# Patient Record
Sex: MALE | Race: WHITE | NOT HISPANIC OR LATINO | Employment: OTHER | URBAN - METROPOLITAN AREA
[De-identification: names, ages, dates, MRNs, and addresses within clinical notes are randomized per-mention and may not be internally consistent; named-entity substitution may affect disease eponyms.]

---

## 2020-03-11 ENCOUNTER — EVALUATION (OUTPATIENT)
Dept: PHYSICAL THERAPY | Facility: CLINIC | Age: 85
End: 2020-03-11
Payer: MEDICARE

## 2020-03-11 DIAGNOSIS — M54.30 SCIATIC LEG PAIN: ICD-10-CM

## 2020-03-11 DIAGNOSIS — M54.16 LUMBAR RADICULOPATHY: Primary | ICD-10-CM

## 2020-03-11 PROCEDURE — 97162 PT EVAL MOD COMPLEX 30 MIN: CPT

## 2020-03-11 PROCEDURE — 97530 THERAPEUTIC ACTIVITIES: CPT

## 2020-03-11 NOTE — LETTER
2020    MD Garry Ma [address]  [address] [facility]    Patient: Steve Mitchell   YOB: 1931   Date of Visit: 3/11/2020     Encounter Diagnosis     ICD-10-CM    1  Lumbar radiculopathy M54 16    2  Sciatic leg pain M54 30        Dear Dr Young Police: Thank you for your recent referral of Steve Mitchell  Please review the attached evaluation summary from Terrell's recent visit  Please verify that you agree with the plan of care by signing the attached order  If you have any questions or concerns, please do not hesitate to call  I sincerely appreciate the opportunity to share in the care of one of your patients and hope to have another opportunity to work with you in the near future  Sincerely,    Ting Mackay, PT      Referring Provider:      I certify that I have read the below Plan of Care and certify the need for these services furnished under this plan of treatment while under my care  MD Garry Ma [address]  [address] [facility]: [phone]          PT Evaluation     Today's date: 3/11/2020  Patient name: Steve Mitchell  : 3/28/1931  MRN: [id number]  Referring provider: Mervat Finley MD  Dx:   Encounter Diagnosis     ICD-10-CM    1  Lumbar radiculopathy M54 16    2  Sciatic leg pain M54 30                   Assessment  Assessment details: Steve Mitchell is a 80 y o  male who presents with gait and balance dysfunction secondary to lumbar radiculopathy  Pt reports with  pain, decreased strength, decreased ROM, impaired balance, impaired sensation, ambulatory dysfunction and postural dysfunction  Due to these impairments, patient has difficulty performing a/iadls including household and community ambulation  Significant examination findings include falls risk as indicated by TUG score and significantly impaired R ankle strength/sensation   Patient's clinical presentation is consistent with their referring diagnosis of Lumbar radiculopathy  Patient has been educated HEP, use of orthotic to supplement R foot drop, and proper use of RW  Patient would benefit from skilled physical therapy to address their aforementioned impairments, improve their level of function and to improve their overall quality of life  Impairments: abnormal gait, abnormal muscle firing, abnormal or restricted ROM, activity intolerance, impaired balance, impaired physical strength, lacks appropriate home exercise program, pain with function, safety issue and poor posture   Understanding of Dx/Px/POC: good   Prognosis: good    Goals  Impairment Goals  - Decrease pain less than 4/10 at worst in past 24 hrs in 4 weeks  - Improve l/s ROM by atleast 25% in 4 weeks  - Pt will demonstrate TUG score of no more than 25 seconds w/ least restrictive AD in 4 weeks  Functional Goals  - Pt will demonstrate TUG score of no more than 14 seconds w/ least restrictive AD to decrease falls risk in 12 weeks  - Pt will demonstrate ambulation w/ maintenance of erect posture and lack of foot drop with use of appropriate orthosis/AD in 12 weeks    - Return to Prior Level of Function in 12 weeks  - Increase Functional Status Measure to: atleast 45 in 12 weeks  - Patient will be independent with HEP in 12 weeks    Plan  Patient would benefit from: skilled PT  Planned modality interventions: thermotherapy: hydrocollator packs, cryotherapy, unattended electrical stimulation and electrical stimulation/Russian stimulation  Planned therapy interventions: joint mobilization, manual therapy, patient education, postural training, activity modification, abdominal trunk stabilization, body mechanics training, flexibility, functional ROM exercises, graded exercise, home exercise program, neuromuscular re-education, strengthening, stretching, therapeutic activities, therapeutic exercise, balance, ADL training and orthotic fitting/training  Frequency: 2x week  Duration in weeks: 10  Plan of Care beginning date: 3/11/2020  Plan of Care expiration date: 2020  Treatment plan discussed with: patient        Subjective Evaluation    History of Present Illness  Date of onset: 2019  Mechanism of injury: Pt reports abrupt insidious onset of weakness and R foot drop began one morning when he wasn't able to get out of bed  Also had pain in back w/ radicular pain extending into R foot  Underwent lumbar decompression surgery on 4/10/19  Pt reports surgery improved drop slightly, however, did not change pain  Pt completed aquatherapy w/ perceived improved LE strength  Pt reports 2 falls over last 2 years  First fall was outside on gravel in which pt was unable to get up with assistance for 2 hours  Most recent fall was last night while exiting bathroom  Pt and daughter deny injury, neurological changes, visiting ED/physician since fall  Admits that he has orthotic to address R foot drop, however, does not use it  Agreed to bring in orthotic next session  Reports prior to 1 year ago, pt's PLOF did not include AD  PMH: HTN    Medications: pt will bring list next session  Pain  Current pain ratin  At best pain ratin  At worst pain ratin  Location: R low back extending into R anterior thigh, lateral calf, GT  Quality: sharp and dull ache    Social Support  Steps to enter house: no  Stairs in house: no   Lives with: adult children    Treatments  Current treatment: physical therapy  Patient Goals  Patient goals for therapy: improved balance and increased strength  Patient goal: walk with a cane, improve strength in leg        Objective    Flowsheet Rows      Most Recent Value   PT/OT G-Codes   Current Score  34   Projected Score  48   FOTO information reviewed  Yes         Posture: Lumbar lordosis is decreased in standing   Flexed posture, forward head, rounded shoulders    Lumbar AROM limitations:  (*=  Pain)  Lumbar flexion: *mod (pain lateral calf)  Lumbar extension: *hussain (pain low back/bilat knees)      Strength:       Right  Left  Hip flexion:  4-/5  4-/5  Knee ext  5/5  5/5  Ankle DF  1/5  5/5      Great toe ext  1/5  3+/5  Ankle PF  5/5  5/5  Knee flex  3+/5  5/5  Ankle inver                  1/5                   3+/5  Ankle francine                 3+/5                 3+/5    AROM/PROM:  Ankle DF:                   0/25                    30  Ankle PF:                    55                      55  Ankle Inversion:          0                         5  Ankle Eversion           5                         20    Sensation:   LT: Diminished L5-S1 dermatome  LT: Absent sensation R instep, however normal along remainder of L4 dermatome     Flexibility:  Hip ER WFL as observed via shoe donning/doffing    Reflexes:   R achilles: 0  L achilles: 0  R patellar: 0  L patellar: 0    Function:   Gait: Pt ambulates over noncompliant terrain, outside of RW w/ flexed posture, R foot drop, decreased jesse, decreased step length bilaterally    Balance:  TUG: w/ RW 35 66"  Rhomberg: TBA  Tandem: TBA    Integumentary:  Xerosis bilat feet L>R  (-) swelling/edema BLE  (-) blanchable erythema throughout bilateral calves/dorsum of feet  (-) signs DVT             Precautions: HTN, FALLS RISK      Manual                                                                                   Exercise Diary              RB             STS                          [id number]             Ankle ROM rockerboard             Rhomberg on foam             Tandem              lunges                                                                                                                                  GT w/orthotic and SPC                                           Modalities

## 2020-03-11 NOTE — PROGRESS NOTES
PT Evaluation     Today's date: 3/11/2020  Patient name: Tiff Garcia  : 3/28/1931  MRN: [id number]  Referring provider: Rod Waller MD  Dx:   Encounter Diagnosis     ICD-10-CM    1  Lumbar radiculopathy M54 16    2  Sciatic leg pain M54 30                 ADDENDUM 20: Pt wished to hold treatment d/t COVID-19, D/C at this time d/t inactivity  Assessment  Assessment details: Tiff Garcia is a 80 y o  male who presents with gait and balance dysfunction secondary to lumbar radiculopathy  Pt reports with  pain, decreased strength, decreased ROM, impaired balance, impaired sensation, ambulatory dysfunction and postural dysfunction  Due to these impairments, patient has difficulty performing a/iadls including household and community ambulation  Significant examination findings include falls risk as indicated by TUG score and significantly impaired R ankle strength/sensation  Patient's clinical presentation is consistent with their referring diagnosis of Lumbar radiculopathy  Patient has been educated HEP, use of orthotic to supplement R foot drop, and proper use of RW  Patient would benefit from skilled physical therapy to address their aforementioned impairments, improve their level of function and to improve their overall quality of life  Impairments: abnormal gait, abnormal muscle firing, abnormal or restricted ROM, activity intolerance, impaired balance, impaired physical strength, lacks appropriate home exercise program, pain with function, safety issue and poor posture   Understanding of Dx/Px/POC: good   Prognosis: good    Goals  Impairment Goals  - Decrease pain less than 4/10 at worst in past 24 hrs in 4 weeks  - Improve l/s ROM by atleast 25% in 4 weeks  - Pt will demonstrate TUG score of no more than 25 seconds w/ least restrictive AD in 4 weeks      Functional Goals  - Pt will demonstrate TUG score of no more than 14 seconds w/ least restrictive AD to decrease falls risk in 12 weeks     - Pt will demonstrate ambulation w/ maintenance of erect posture and lack of foot drop with use of appropriate orthosis/AD in 12 weeks  - Return to Prior Level of Function in 12 weeks  - Increase Functional Status Measure to: atleast 45 in 12 weeks  - Patient will be independent with HEP in 12 weeks    Plan  Patient would benefit from: skilled PT  Planned modality interventions: thermotherapy: hydrocollator packs, cryotherapy, unattended electrical stimulation and electrical stimulation/Russian stimulation  Planned therapy interventions: joint mobilization, manual therapy, patient education, postural training, activity modification, abdominal trunk stabilization, body mechanics training, flexibility, functional ROM exercises, graded exercise, home exercise program, neuromuscular re-education, strengthening, stretching, therapeutic activities, therapeutic exercise, balance, ADL training and orthotic fitting/training  Frequency: 2x week  Duration in weeks: 10  Plan of Care beginning date: 3/11/2020  Plan of Care expiration date: 5/20/2020  Treatment plan discussed with: patient        Subjective Evaluation    History of Present Illness  Date of onset: 1/1/2019  Mechanism of injury: Pt reports abrupt insidious onset of weakness and R foot drop began one morning when he wasn't able to get out of bed  Also had pain in back w/ radicular pain extending into R foot  Underwent lumbar decompression surgery on 4/10/19  Pt reports surgery improved drop slightly, however, did not change pain  Pt completed aquatherapy w/ perceived improved LE strength  Pt reports 2 falls over last 2 years  First fall was outside on gravel in which pt was unable to get up with assistance for 2 hours  Most recent fall was last night while exiting bathroom  Pt and daughter deny injury, neurological changes, visiting ED/physician since fall  Admits that he has orthotic to address R foot drop, however, does not use it   Agreed to bring in orthotic next session  Reports prior to 1 year ago, pt's PLOF did not include AD  PMH: HTN    Medications: pt will bring list next session  Pain  Current pain ratin  At best pain ratin  At worst pain ratin  Location: R low back extending into R anterior thigh, lateral calf, GT  Quality: sharp and dull ache    Social Support  Steps to enter house: no  Stairs in house: no   Lives with: adult children    Treatments  Current treatment: physical therapy  Patient Goals  Patient goals for therapy: improved balance and increased strength  Patient goal: walk with a cane, improve strength in leg        Objective    Flowsheet Rows      Most Recent Value   PT/OT G-Codes   Current Score  34   Projected Score  48   FOTO information reviewed  Yes         Posture: Lumbar lordosis is decreased in standing   Flexed posture, forward head, rounded shoulders    Lumbar AROM limitations:  (*=  Pain)  Lumbar flexion: *mod (pain lateral calf)  Lumbar extension: *hussain (pain low back/bilat knees)      Strength:       Right  Left  Hip flexion:  4-/5  4-/5  Knee ext  5/5  5/5  Ankle DF  1/5  5/5      Great toe ext  1/5  3+/5  Ankle PF  5/5  5/5  Knee flex  3+/5  5/5  Ankle inver                  1/5                   3+/5  Ankle francine                 3+/5                 3+/5    AROM/PROM:  Ankle DF:                   0/25                    30  Ankle PF:                    55                      55  Ankle Inversion:          0                         5  Ankle Eversion           5                         20    Sensation:   LT: Diminished L5-S1 dermatome  LT: Absent sensation R instep, however normal along remainder of L4 dermatome     Flexibility:  Hip ER WFL as observed via shoe donning/doffing    Reflexes:   R achilles: 0  L achilles: 0  R patellar: 0  L patellar: 0    Function:   Gait: Pt ambulates over noncompliant terrain, outside of RW w/ flexed posture, R foot drop, decreased jesse, decreased step length bilaterally    Balance:  TUG: w/ RW 35 66"  Rhomberg: TBA  Tandem: TBA    Integumentary:  Xerosis bilat feet L>R  (-) swelling/edema BLE  (-) blanchable erythema throughout bilateral calves/dorsum of feet  (-) signs DVT             Precautions: HTN, FALLS RISK      Manual                                                                                   Exercise Diary              RB             STS                          [email or web address]             Ankle ROM rockerboard             Rhomberg on foam             Tandem              lunges                                                                                                                                  GT w/orthotic and SPC                                           Modalities

## 2020-03-12 ENCOUNTER — TRANSCRIBE ORDERS (OUTPATIENT)
Dept: PHYSICAL THERAPY | Facility: CLINIC | Age: 85
End: 2020-03-12

## 2020-03-12 DIAGNOSIS — M54.16 LUMBAR RADICULOPATHY: Primary | ICD-10-CM

## 2020-03-17 ENCOUNTER — APPOINTMENT (OUTPATIENT)
Dept: PHYSICAL THERAPY | Facility: CLINIC | Age: 85
End: 2020-03-17
Payer: MEDICARE

## 2020-03-19 ENCOUNTER — APPOINTMENT (OUTPATIENT)
Dept: PHYSICAL THERAPY | Facility: CLINIC | Age: 85
End: 2020-03-19
Payer: MEDICARE

## 2020-03-23 ENCOUNTER — APPOINTMENT (OUTPATIENT)
Dept: PHYSICAL THERAPY | Facility: CLINIC | Age: 85
End: 2020-03-23
Payer: MEDICARE

## 2020-03-24 ENCOUNTER — APPOINTMENT (OUTPATIENT)
Dept: PHYSICAL THERAPY | Facility: CLINIC | Age: 85
End: 2020-03-24
Payer: MEDICARE

## 2020-03-26 ENCOUNTER — APPOINTMENT (OUTPATIENT)
Dept: PHYSICAL THERAPY | Facility: CLINIC | Age: 85
End: 2020-03-26
Payer: MEDICARE

## 2020-03-27 ENCOUNTER — APPOINTMENT (OUTPATIENT)
Dept: PHYSICAL THERAPY | Facility: CLINIC | Age: 85
End: 2020-03-27
Payer: MEDICARE

## 2020-03-31 ENCOUNTER — APPOINTMENT (OUTPATIENT)
Dept: PHYSICAL THERAPY | Facility: CLINIC | Age: 85
End: 2020-03-31
Payer: MEDICARE

## 2022-09-09 DIAGNOSIS — F41.9 ANXIETY: Primary | ICD-10-CM

## 2022-09-09 RX ORDER — LORAZEPAM 0.5 MG/1
0.25 TABLET ORAL EVERY 8 HOURS PRN
Qty: 30 TABLET | Refills: 2 | Status: SHIPPED | OUTPATIENT
Start: 2022-09-09 | End: 2022-09-28 | Stop reason: SDUPTHER

## 2022-09-12 DIAGNOSIS — S72.002D CLOSED FRACTURE OF LEFT HIP WITH ROUTINE HEALING, SUBSEQUENT ENCOUNTER: Primary | ICD-10-CM

## 2022-09-12 RX ORDER — TRAMADOL HYDROCHLORIDE 50 MG/1
50 TABLET ORAL
COMMUNITY
Start: 2019-04-11 | End: 2022-09-12 | Stop reason: SDUPTHER

## 2022-09-12 RX ORDER — NALOXONE HYDROCHLORIDE 4 MG/.1ML
SPRAY NASAL
Qty: 1 EACH | Refills: 1 | Status: SHIPPED | OUTPATIENT
Start: 2022-09-12

## 2022-09-12 RX ORDER — TRAMADOL HYDROCHLORIDE 50 MG/1
50 TABLET ORAL EVERY 6 HOURS PRN
Qty: 60 TABLET | Refills: 2 | Status: SHIPPED | OUTPATIENT
Start: 2022-09-12 | End: 2022-10-12

## 2022-09-16 DIAGNOSIS — Z98.890 STATUS POST LUMBAR SPINE SURGERY FOR DECOMPRESSION OF SPINAL CORD: Primary | ICD-10-CM

## 2022-09-16 PROBLEM — N40.1 BPH WITH OBSTRUCTION/LOWER URINARY TRACT SYMPTOMS: Status: ACTIVE | Noted: 2022-09-16

## 2022-09-16 PROBLEM — N13.8 BPH WITH OBSTRUCTION/LOWER URINARY TRACT SYMPTOMS: Status: ACTIVE | Noted: 2022-09-16

## 2022-09-16 PROBLEM — H91.90 HEARING LOSS: Status: ACTIVE | Noted: 2022-09-16

## 2022-09-16 PROBLEM — I10 ESSENTIAL HYPERTENSION: Status: ACTIVE | Noted: 2019-04-01

## 2022-09-16 PROBLEM — M48.062 PSEUDOCLAUDICATION SYNDROME: Status: ACTIVE | Noted: 2019-04-01

## 2022-09-16 RX ORDER — HEPARIN SODIUM 5000 [USP'U]/ML
5000 INJECTION, SOLUTION INTRAVENOUS; SUBCUTANEOUS EVERY 8 HOURS
COMMUNITY
Start: 2019-04-16

## 2022-09-16 RX ORDER — LISINOPRIL 20 MG/1
TABLET ORAL
COMMUNITY
Start: 2022-08-10

## 2022-09-16 RX ORDER — CHLORHEXIDINE GLUCONATE 0.12 MG/ML
RINSE ORAL
COMMUNITY
Start: 2022-08-22

## 2022-09-16 RX ORDER — GABAPENTIN 300 MG/1
CAPSULE ORAL
COMMUNITY
Start: 2022-08-10

## 2022-09-16 RX ORDER — CLOPIDOGREL BISULFATE 75 MG/1
TABLET ORAL
COMMUNITY
Start: 2022-08-10

## 2022-09-16 RX ORDER — DOCUSATE SODIUM 100 MG/1
100 CAPSULE, LIQUID FILLED ORAL 2 TIMES DAILY
COMMUNITY
Start: 2019-04-11

## 2022-09-16 RX ORDER — FOLIC ACID 1 MG/1
1 TABLET ORAL DAILY
COMMUNITY

## 2022-09-16 RX ORDER — OXYCODONE HYDROCHLORIDE 5 MG/1
5 TABLET ORAL EVERY 4 HOURS PRN
Qty: 90 TABLET | Refills: 0 | Status: SHIPPED | OUTPATIENT
Start: 2022-09-16 | End: 2022-10-16

## 2022-09-16 RX ORDER — TAMSULOSIN HYDROCHLORIDE 0.4 MG/1
CAPSULE ORAL
COMMUNITY
Start: 2022-08-10

## 2022-09-16 RX ORDER — METOPROLOL SUCCINATE 100 MG/1
TABLET, EXTENDED RELEASE ORAL
COMMUNITY
Start: 2022-08-10

## 2022-09-16 RX ORDER — DULOXETIN HYDROCHLORIDE 20 MG/1
CAPSULE, DELAYED RELEASE ORAL
COMMUNITY
Start: 2022-08-10

## 2022-09-16 RX ORDER — TAMSULOSIN HYDROCHLORIDE 0.4 MG/1
0.4 CAPSULE ORAL DAILY
COMMUNITY
Start: 2019-04-17

## 2022-09-16 RX ORDER — ASPIRIN 81 MG/1
81 TABLET ORAL DAILY
COMMUNITY

## 2022-09-16 RX ORDER — ATORVASTATIN CALCIUM 40 MG/1
TABLET, FILM COATED ORAL
COMMUNITY
Start: 2022-08-10

## 2022-09-28 DIAGNOSIS — Z51.5 HOSPICE CARE: ICD-10-CM

## 2022-09-28 DIAGNOSIS — Z51.5 HOSPICE CARE: Primary | ICD-10-CM

## 2022-09-28 DIAGNOSIS — F41.9 ANXIETY: ICD-10-CM

## 2022-09-28 RX ORDER — LORAZEPAM 0.5 MG/1
0.5 TABLET ORAL EVERY 6 HOURS PRN
Qty: 30 TABLET | Refills: 2 | Status: SHIPPED | OUTPATIENT
Start: 2022-09-28 | End: 2022-09-29

## 2022-09-28 RX ORDER — MORPHINE SULFATE 100 MG/5ML
5 SOLUTION, CONCENTRATE ORAL
Qty: 30 ML | Refills: 0 | Status: SHIPPED | OUTPATIENT
Start: 2022-09-28 | End: 2022-09-28 | Stop reason: SDUPTHER

## 2022-09-28 RX ORDER — MORPHINE SULFATE 100 MG/5ML
5 SOLUTION, CONCENTRATE ORAL
Qty: 30 ML | Refills: 0 | Status: SHIPPED | OUTPATIENT
Start: 2022-09-28 | End: 2022-09-29 | Stop reason: SDUPTHER

## 2022-09-28 RX ORDER — LORAZEPAM 0.5 MG/1
0.5 TABLET ORAL EVERY 6 HOURS PRN
Qty: 30 TABLET | Refills: 2 | Status: SHIPPED | OUTPATIENT
Start: 2022-09-28 | End: 2022-09-28 | Stop reason: SDUPTHER

## 2022-09-29 DIAGNOSIS — Z51.5 HOSPICE CARE: ICD-10-CM

## 2022-09-29 RX ORDER — LORAZEPAM 2 MG/ML
1 CONCENTRATE ORAL EVERY 6 HOURS
Qty: 30 ML | Refills: 1 | Status: SHIPPED | OUTPATIENT
Start: 2022-09-29

## 2022-09-29 RX ORDER — MORPHINE SULFATE 100 MG/5ML
5 SOLUTION, CONCENTRATE ORAL EVERY 4 HOURS
Qty: 30 ML | Refills: 0 | Status: SHIPPED | OUTPATIENT
Start: 2022-09-29 | End: 2022-10-19